# Patient Record
Sex: MALE | Race: WHITE | NOT HISPANIC OR LATINO | Employment: OTHER | URBAN - NONMETROPOLITAN AREA
[De-identification: names, ages, dates, MRNs, and addresses within clinical notes are randomized per-mention and may not be internally consistent; named-entity substitution may affect disease eponyms.]

---

## 2024-10-24 ENCOUNTER — HOSPITAL ENCOUNTER (OUTPATIENT)
Dept: RADIOLOGY | Facility: HOSPITAL | Age: 74
Discharge: HOME | End: 2024-10-24
Payer: MEDICARE

## 2024-10-24 DIAGNOSIS — M48.062 SPINAL STENOSIS, LUMBAR REGION WITH NEUROGENIC CLAUDICATION: ICD-10-CM

## 2024-10-24 DIAGNOSIS — M47.12 OTHER SPONDYLOSIS WITH MYELOPATHY, CERVICAL REGION: ICD-10-CM

## 2024-10-24 PROCEDURE — 72141 MRI NECK SPINE W/O DYE: CPT

## 2024-10-24 PROCEDURE — 72148 MRI LUMBAR SPINE W/O DYE: CPT

## 2024-10-24 PROCEDURE — 72148 MRI LUMBAR SPINE W/O DYE: CPT | Performed by: RADIOLOGY

## 2024-10-24 PROCEDURE — 72141 MRI NECK SPINE W/O DYE: CPT | Performed by: RADIOLOGY

## 2025-01-16 ENCOUNTER — APPOINTMENT (OUTPATIENT)
Dept: RADIOLOGY | Facility: HOSPITAL | Age: 75
End: 2025-01-16
Payer: MEDICARE

## 2025-01-16 ENCOUNTER — HOSPITAL ENCOUNTER (EMERGENCY)
Facility: HOSPITAL | Age: 75
Discharge: HOME | End: 2025-01-16
Attending: EMERGENCY MEDICINE
Payer: MEDICARE

## 2025-01-16 VITALS
DIASTOLIC BLOOD PRESSURE: 88 MMHG | TEMPERATURE: 97.1 F | BODY MASS INDEX: 26.68 KG/M2 | HEIGHT: 67 IN | HEART RATE: 73 BPM | OXYGEN SATURATION: 100 % | WEIGHT: 170 LBS | RESPIRATION RATE: 16 BRPM | SYSTOLIC BLOOD PRESSURE: 167 MMHG

## 2025-01-16 DIAGNOSIS — M54.6 ACUTE BILATERAL THORACIC BACK PAIN: ICD-10-CM

## 2025-01-16 DIAGNOSIS — S16.1XXA CERVICAL STRAIN, ACUTE, INITIAL ENCOUNTER: ICD-10-CM

## 2025-01-16 DIAGNOSIS — S09.90XA CLOSED HEAD INJURY, INITIAL ENCOUNTER: ICD-10-CM
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
## 2025-01-16 DIAGNOSIS — S70.12XA CONTUSION OF LEFT THIGH, INITIAL ENCOUNTER: ICD-10-CM

## 2025-01-16 LAB
ANION GAP SERPL CALC-SCNC: 10 MMOL/L (ref 10–20)
APPEARANCE UR: CLEAR
BASOPHILS # BLD AUTO: 0.06 X10*3/UL (ref 0–0.1)
BASOPHILS NFR BLD AUTO: 0.7 %
BILIRUB UR STRIP.AUTO-MCNC: NEGATIVE MG/DL
BUN SERPL-MCNC: 14 MG/DL (ref 6–23)
CALCIUM SERPL-MCNC: 9.3 MG/DL (ref 8.6–10.3)
CHLORIDE SERPL-SCNC: 106 MMOL/L (ref 98–107)
CO2 SERPL-SCNC: 29 MMOL/L (ref 21–32)
COLOR UR: YELLOW
CREAT SERPL-MCNC: 0.97 MG/DL (ref 0.5–1.3)
EGFRCR SERPLBLD CKD-EPI 2021: 81 ML/MIN/1.73M*2
EOSINOPHIL # BLD AUTO: 0.23 X10*3/UL (ref 0–0.4)
EOSINOPHIL NFR BLD AUTO: 2.8 %
ERYTHROCYTE [DISTWIDTH] IN BLOOD BY AUTOMATED COUNT: 12.8 % (ref 11.5–14.5)
GLUCOSE SERPL-MCNC: 89 MG/DL (ref 74–99)
GLUCOSE UR STRIP.AUTO-MCNC: NEGATIVE MG/DL
HCT VFR BLD AUTO: 38 % (ref 41–52)
HGB BLD-MCNC: 12.7 G/DL (ref 13.5–17.5)
HOLD SPECIMEN: NORMAL
IMM GRANULOCYTES # BLD AUTO: 0.02 X10*3/UL (ref 0–0.5)
IMM GRANULOCYTES NFR BLD AUTO: 0.2 % (ref 0–0.9)
KETONES UR STRIP.AUTO-MCNC: NEGATIVE MG/DL
LEUKOCYTE ESTERASE UR QL STRIP.AUTO: NEGATIVE
LYMPHOCYTES # BLD AUTO: 1.94 X10*3/UL (ref 0.8–3)
LYMPHOCYTES NFR BLD AUTO: 23.3 %
MCH RBC QN AUTO: 28.9 PG (ref 26–34)
MCHC RBC AUTO-ENTMCNC: 33.4 G/DL (ref 32–36)
MCV RBC AUTO: 86 FL (ref 80–100)
MONOCYTES # BLD AUTO: 0.92 X10*3/UL (ref 0.05–0.8)
MONOCYTES NFR BLD AUTO: 11.1 %
NEUTROPHILS # BLD AUTO: 5.14 X10*3/UL (ref 1.6–5.5)
NEUTROPHILS NFR BLD AUTO: 61.9 %
NITRITE UR QL STRIP.AUTO: NEGATIVE
NRBC BLD-RTO: 0 /100 WBCS (ref 0–0)
PH UR STRIP.AUTO: 6 [PH]
PLATELET # BLD AUTO: 195 X10*3/UL (ref 150–450)
POTASSIUM SERPL-SCNC: 3.9 MMOL/L (ref 3.5–5.3)
PROT UR STRIP.AUTO-MCNC: NEGATIVE MG/DL
RBC # BLD AUTO: 4.4 X10*6/UL (ref 4.5–5.9)
RBC # UR STRIP.AUTO: NEGATIVE /UL
SODIUM SERPL-SCNC: 141 MMOL/L (ref 136–145)
SP GR UR STRIP.AUTO: 1.02
UROBILINOGEN UR STRIP.AUTO-MCNC: <2 MG/DL
WBC # BLD AUTO: 8.3 X10*3/UL (ref 4.4–11.3)

## 2025-01-16 PROCEDURE — 73552 X-RAY EXAM OF FEMUR 2/>: CPT | Mod: LT

## 2025-01-16 PROCEDURE — 72128 CT CHEST SPINE W/O DYE: CPT | Mod: RCN

## 2025-01-16 PROCEDURE — 80048 BASIC METABOLIC PNL TOTAL CA: CPT | Performed by: EMERGENCY MEDICINE

## 2025-01-16 PROCEDURE — 72128 CT CHEST SPINE W/O DYE: CPT | Mod: RCN | Performed by: RADIOLOGY

## 2025-01-16 PROCEDURE — 70450 CT HEAD/BRAIN W/O DYE: CPT

## 2025-01-16 PROCEDURE — 99285 EMERGENCY DEPT VISIT HI MDM: CPT | Mod: 25 | Performed by: EMERGENCY MEDICINE

## 2025-01-16 PROCEDURE — 85025 COMPLETE CBC W/AUTO DIFF WBC: CPT | Performed by: EMERGENCY MEDICINE

## 2025-01-16 PROCEDURE — 72125 CT NECK SPINE W/O DYE: CPT

## 2025-01-16 PROCEDURE — 71250 CT THORAX DX C-: CPT | Mod: FOREIGN READ | Performed by: RADIOLOGY

## 2025-01-16 PROCEDURE — 81003 URINALYSIS AUTO W/O SCOPE: CPT | Performed by: EMERGENCY MEDICINE

## 2025-01-16 PROCEDURE — 73552 X-RAY EXAM OF FEMUR 2/>: CPT | Mod: LEFT SIDE | Performed by: RADIOLOGY

## 2025-01-16 PROCEDURE — 70450 CT HEAD/BRAIN W/O DYE: CPT | Performed by: RADIOLOGY

## 2025-01-16 PROCEDURE — 71250 CT THORAX DX C-: CPT

## 2025-01-16 PROCEDURE — 36415 COLL VENOUS BLD VENIPUNCTURE: CPT | Performed by: EMERGENCY MEDICINE

## 2025-01-16 PROCEDURE — 72125 CT NECK SPINE W/O DYE: CPT | Performed by: RADIOLOGY

## 2025-01-16 RX ORDER — FUROSEMIDE 40 MG/1
25 TABLET ORAL DAILY
COMMUNITY

## 2025-01-16 RX ORDER — NABUMETONE 500 MG/1
500 TABLET, FILM COATED ORAL 2 TIMES DAILY
COMMUNITY

## 2025-01-16 RX ORDER — LISINOPRIL 40 MG/1
40 TABLET ORAL
COMMUNITY
Start: 2024-08-27

## 2025-01-16 RX ORDER — METOPROLOL SUCCINATE 25 MG/1
25 TABLET, EXTENDED RELEASE ORAL DAILY
COMMUNITY

## 2025-01-16 RX ORDER — IBUPROFEN 600 MG/1
600 TABLET ORAL EVERY 8 HOURS PRN
Qty: 30 TABLET | Refills: 0 | Status: SHIPPED | OUTPATIENT
Start: 2025-01-16

## 2025-01-16 ASSESSMENT — PAIN - FUNCTIONAL ASSESSMENT
PAIN_FUNCTIONAL_ASSESSMENT: 0-10
PAIN_FUNCTIONAL_ASSESSMENT: 0-10

## 2025-01-16 ASSESSMENT — PAIN SCALES - GENERAL
PAINLEVEL_OUTOF10: 4
PAINLEVEL_OUTOF10: 6

## 2025-01-16 NOTE — DISCHARGE INSTRUCTIONS
Ibuprofen as prescribed for pain.    Use an ice pack over the contusion of the left thigh for 1 hour 2 or 3 times per day for the next 5 days.    Follow with your primary care doctor 1 week for recheck.    Return for any worsening signs or symptoms

## 2025-01-16 NOTE — ED PROVIDER NOTES
[facility]   ED  Provider Note  1/16/2025  4:18 PM  AC02/AC02      Chief Complaint   Patient presents with    Fall        History of Present Illness:   Marshal Harrington is a 74 y.o. male presenting to the ED for sustained from a fall, beginning just prior to arrival.  The complaint has been persistent, moderate in severity, and worsened by movement and activity.  Patient walked in from outside had snow on the ball of his boots.  This caused him to slip and fall down 8 steps.  He complains of pain in the back of his head neck and upper back.  He also has a hematoma with some swelling on his left lateral thigh.  He denies loss of conscious or focal deficit.  He has history of cervical stenosis and is scheduled for surgery next month.  He denies any new numbness or weakness since the fall.  He is not short of breath.  He has no abdominal pain.  He has no upper extremity injury.      Review of Systems:   Pertinent positives and review of systems as noted above.  Remaining 10 review of systems is negative or noncontributory to today's episode of care.  Review of Systems       --------------------------------------------- PAST HISTORY ---------------------------------------------  Past Medical History:   Aortic valve disorders post replacement., inflammatory bowel disease, degenerative disc disease, hamstring strain, hypertension, hyperlipidemia,  Past surgical history:  Aortic valve replacement  Appendectomy, arthroplasty left shoulder, arthroplasty right elbow, arthroplasty right knee, cardiac catheterization, exploratory laparotomy and celiotomy with biopsy  Social History:   Social History     Social History Narrative    Not on file        Family History: family history is not on file. Unless otherwise noted, family history is non contributory    Patient's Medications   New Prescriptions    No medications on file   Previous Medications    FUROSEMIDE (LASIX) 40 MG TABLET    Take 25 mg by mouth once daily.     LISINOPRIL 40 MG TABLET    Take 1 tablet (40 mg) by mouth once daily.    METOPROLOL SUCCINATE XL (TOPROL-XL) 25 MG 24 HR TABLET    Take 1 tablet (25 mg) by mouth once daily.    NABUMETONE (RELAFEN) 500 MG TABLET    Take 1 tablet (500 mg) by mouth 2 times a day.   Modified Medications    No medications on file   Discontinued Medications    No medications on file      The patient’s home medications have been reviewed.    Allergies: Niacin and Simvastatin    -------------------------------------------------- RESULTS -------------------------------------------------  All laboratory and radiology results have been personally reviewed by myself   LABS:  Labs Reviewed   CBC WITH AUTO DIFFERENTIAL - Abnormal       Result Value    WBC 8.3      nRBC 0.0      RBC 4.40 (*)     Hemoglobin 12.7 (*)     Hematocrit 38.0 (*)     MCV 86      MCH 28.9      MCHC 33.4      RDW 12.8      Platelets 195      Neutrophils % 61.9      Immature Granulocytes %, Automated 0.2      Lymphocytes % 23.3      Monocytes % 11.1      Eosinophils % 2.8      Basophils % 0.7      Neutrophils Absolute 5.14      Immature Granulocytes Absolute, Automated 0.02      Lymphocytes Absolute 1.94      Monocytes Absolute 0.92 (*)     Eosinophils Absolute 0.23      Basophils Absolute 0.06     BASIC METABOLIC PANEL - Normal    Glucose 89      Sodium 141      Potassium 3.9      Chloride 106      Bicarbonate 29      Anion Gap 10      Urea Nitrogen 14      Creatinine 0.97      eGFR 81      Calcium 9.3     URINALYSIS WITH REFLEX CULTURE AND MICROSCOPIC    Narrative:     The following orders were created for panel order Urinalysis with Reflex Culture and Microscopic.  Procedure                               Abnormality         Status                     ---------                               -----------         ------                     Urinalysis with Reflex C...[[id number]]                                                 Extra Urine Gray Tube[[id number]]                    "                                      Please view results for these tests on the individual orders.   URINALYSIS WITH REFLEX CULTURE AND MICROSCOPIC   EXTRA URINE GRAY TUBE   GRAY TOP         RADIOLOGY:  Interpreted by Radiologist.  XR femur left 2+ views   Final Result   There are degenerative changes in the left hip and knee but no acute   bony abnormalities are visible..   Signed by Dat Thompson MD      CT cervical spine wo IV contrast   Final Result   Severe level spondylosis without acute osseous abnormality of the   cervical spine.        MACRO:   None        Signed by: Al Johnson 1/16/2025 5:49 PM   Dictation workstation:   [id number]      CT thoracic spine wo IV contrast   Final Result   No evidence of acute fracture or subluxation.   Signed by Jose Yang MD      CT chest wo IV contrast   Final Result   No acute findings in the chest..   Signed by Marshal Avila MD      CT head wo IV contrast   Final Result   No CT evidence of acute intracranial injury.                  MACRO:   None             Signed by: Al Johnson 1/16/2025 5:47 PM   Dictation workstation:   [id number]          No results found for this or any previous visit (from the past 4464 hours).  ------------------------- NURSING NOTES AND VITALS REVIEWED ---------------------------   The nursing notes within the ED encounter and vital signs as below have been reviewed.   /89   Pulse 67   Temp 36.4 °C (97.6 °F)   Resp 17   Ht 1.702 m (5' 7\")   Wt 77.1 kg (170 lb)   SpO2 100%   BMI 26.63 kg/m²   Oxygen Saturation Interpretation: Normal      ---------------------------------------------------PHYSICAL EXAM--------------------------------------  Physical Exam   Constitutional/General: Alert,  well appearing, non toxic in NAD  Head: Normocephalic and atraumatic  Eyes: PERRL, EOMI, conjunctiva normal, sclera non icteric  Mouth: Oropharynx clear, handling secretions, no trismus, no asymmetry of the posterior oropharynx or uvular " edema  Neck: Supple, full ROM, superior and middle posterior neck tenderness to palpation in the midline, no stridor, no crepitus, no meningeal signs  Respiratory: Lungs clear to auscultation bilaterally, no wheezes, rales, or rhonchi. Not in respiratory distress  Cardiovascular:  Regular rate. Regular rhythm. No murmurs, gallops, or rubs. 2+ distal pulses  Chest: No chest wall tenderness  GI:  Abdomen Soft, Non tender, Non distended.  +BS. No organomegaly, no palpable masses,  No rebound, guarding, or rigidity.   Musculoskeletal: Moves all extremities x 4. Warm and well perfused, no clubbing, cyanosis, or edema. Capillary refill <3 seconds, obvious hematoma lateral left thigh below the hip.,  Mild tenderness over the mid thoracic spine and paraspinal regions.  No scapular tenderness.  Upper extremities atraumatic nontender, pelvis stable nontender, gait normal without antalgia.  Integument: skin warm and dry. No rashes.   Lymphatic: no lymphadenopathy noted  Neurologic: No focal deficits, symmetric strength 5/5 in the upper and lower extremities bilaterally  Psychiatric: Normal Affect    Procedures    ------------------------------ ED COURSE/MEDICAL DECISION MAKING----------------------  Diagnoses as of 01/16/25 1822   Fall, initial encounter   Contusion of left thigh, initial encounter   Acute bilateral thoracic back pain      Patient fell down 8 steps earlier today.  He has a hematoma on his left lateral thigh.  CT scans of his head neck thoracic spine and chest revealed no acute pathology.  His electrolytes showed no significant abnormalities.  His CBC showed a white count of 8.3 hemoglobin 12.7 and a platelet count of 1 95,000.  He is ambulatory and stable for outpatient management.      Medical Decision Making:   Discharged to home  Diagnoses as of 01/16/25 1824   Fall, initial encounter   Contusion of left thigh, initial encounter   Acute bilateral thoracic back pain   Closed head injury, initial encounter    Cervical strain, acute, initial encounter      Counseling:   The emergency provider has spoken with the patient and mother.  We discussed today’s results, in addition to providing specific details for the plan of care and counseling regarding the diagnosis and prognosis.  Questions are answered at this time and they are agreeable with the plan.      --------------------------------- IMPRESSION AND DISPOSITION ---------------------------------        IMPRESSION  1. Fall, initial encounter    2. Contusion of left thigh, initial encounter    3. Acute bilateral thoracic back pain    4. Closed head injury, initial encounter    5. Cervical strain, acute, initial encounter        DISPOSITION  Disposition: Discharge to home  Patient condition is fair      Billing Provider Critical Care Time: 0 minutes     Carmine Ulloa MD  01/16/25 [id number]

## 2025-01-17 LAB — HOLD SPECIMEN: NORMAL
